# Patient Record
Sex: MALE | Race: WHITE | NOT HISPANIC OR LATINO | Employment: OTHER | URBAN - METROPOLITAN AREA
[De-identification: names, ages, dates, MRNs, and addresses within clinical notes are randomized per-mention and may not be internally consistent; named-entity substitution may affect disease eponyms.]

---

## 2023-01-01 ENCOUNTER — HOSPITAL ENCOUNTER (OUTPATIENT)
Facility: HOSPITAL | Age: 83
Setting detail: OBSERVATION
Discharge: NURSING, REHAB OR LONG-TERM CARE | End: 2023-12-13
Attending: STUDENT IN AN ORGANIZED HEALTH CARE EDUCATION/TRAINING PROGRAM | Admitting: INTERNAL MEDICINE
Payer: MEDICARE

## 2023-01-01 VITALS
WEIGHT: 230 LBS | SYSTOLIC BLOOD PRESSURE: 114 MMHG | HEART RATE: 73 BPM | DIASTOLIC BLOOD PRESSURE: 57 MMHG | HEIGHT: 75 IN | RESPIRATION RATE: 16 BRPM | TEMPERATURE: 98.8 F | BODY MASS INDEX: 28.6 KG/M2 | OXYGEN SATURATION: 96 %

## 2023-01-01 DIAGNOSIS — L03.90 CELLULITIS, UNSPECIFIED CELLULITIS SITE: Primary | ICD-10-CM

## 2023-01-01 DIAGNOSIS — I83.008: ICD-10-CM

## 2023-01-01 DIAGNOSIS — I83.009 VENOUS STASIS ULCER, UNSPECIFIED SITE, UNSPECIFIED ULCER STAGE, UNSPECIFIED WHETHER VARICOSE VEINS PRESENT (MULTI): ICD-10-CM

## 2023-01-01 DIAGNOSIS — L03.116 CELLULITIS OF LEFT LOWER LIMB: ICD-10-CM

## 2023-01-01 DIAGNOSIS — L97.809: ICD-10-CM

## 2023-01-01 DIAGNOSIS — E78.5 HYPERLIPIDEMIA, UNSPECIFIED HYPERLIPIDEMIA TYPE: ICD-10-CM

## 2023-01-01 DIAGNOSIS — I10 HYPERTENSION, UNSPECIFIED TYPE: ICD-10-CM

## 2023-01-01 DIAGNOSIS — L97.909 VENOUS STASIS ULCER, UNSPECIFIED SITE, UNSPECIFIED ULCER STAGE, UNSPECIFIED WHETHER VARICOSE VEINS PRESENT (MULTI): ICD-10-CM

## 2023-01-01 DIAGNOSIS — E11.9 TYPE 2 DIABETES MELLITUS WITHOUT COMPLICATION, UNSPECIFIED WHETHER LONG TERM INSULIN USE (MULTI): ICD-10-CM

## 2023-01-01 LAB
ANION GAP SERPL CALC-SCNC: 10 MMOL/L (ref 10–20)
ANION GAP SERPL CALC-SCNC: 12 MMOL/L (ref 10–20)
BASOPHILS # BLD AUTO: 0.03 X10*3/UL (ref 0–0.1)
BASOPHILS NFR BLD AUTO: 0.5 %
BUN SERPL-MCNC: 12 MG/DL (ref 6–23)
BUN SERPL-MCNC: 14 MG/DL (ref 6–23)
CALCIUM SERPL-MCNC: 8.3 MG/DL (ref 8.6–10.3)
CALCIUM SERPL-MCNC: 8.5 MG/DL (ref 8.6–10.3)
CHLORIDE SERPL-SCNC: 102 MMOL/L (ref 98–107)
CHLORIDE SERPL-SCNC: 99 MMOL/L (ref 98–107)
CO2 SERPL-SCNC: 25 MMOL/L (ref 21–32)
CO2 SERPL-SCNC: 26 MMOL/L (ref 21–32)
CREAT SERPL-MCNC: 0.95 MG/DL (ref 0.5–1.3)
CREAT SERPL-MCNC: 1.1 MG/DL (ref 0.5–1.3)
EOSINOPHIL # BLD AUTO: 0.1 X10*3/UL (ref 0–0.4)
EOSINOPHIL NFR BLD AUTO: 1.7 %
ERYTHROCYTE [DISTWIDTH] IN BLOOD BY AUTOMATED COUNT: 12.4 % (ref 11.5–14.5)
ERYTHROCYTE [DISTWIDTH] IN BLOOD BY AUTOMATED COUNT: 12.5 % (ref 11.5–14.5)
EST. AVERAGE GLUCOSE BLD GHB EST-MCNC: 329 MG/DL
GFR SERPL CREATININE-BSD FRML MDRD: 67 ML/MIN/1.73M*2
GFR SERPL CREATININE-BSD FRML MDRD: 79 ML/MIN/1.73M*2
GLUCOSE BLD MANUAL STRIP-MCNC: 126 MG/DL (ref 74–99)
GLUCOSE BLD MANUAL STRIP-MCNC: 126 MG/DL (ref 74–99)
GLUCOSE BLD MANUAL STRIP-MCNC: 151 MG/DL (ref 74–99)
GLUCOSE BLD MANUAL STRIP-MCNC: 180 MG/DL (ref 74–99)
GLUCOSE BLD MANUAL STRIP-MCNC: 186 MG/DL (ref 74–99)
GLUCOSE BLD MANUAL STRIP-MCNC: 200 MG/DL (ref 74–99)
GLUCOSE BLD MANUAL STRIP-MCNC: 249 MG/DL (ref 74–99)
GLUCOSE BLD MANUAL STRIP-MCNC: 254 MG/DL (ref 74–99)
GLUCOSE BLD MANUAL STRIP-MCNC: 260 MG/DL (ref 74–99)
GLUCOSE BLD MANUAL STRIP-MCNC: 265 MG/DL (ref 74–99)
GLUCOSE BLD MANUAL STRIP-MCNC: 293 MG/DL (ref 74–99)
GLUCOSE SERPL-MCNC: 180 MG/DL (ref 74–99)
GLUCOSE SERPL-MCNC: 299 MG/DL (ref 74–99)
HBA1C MFR BLD: 13.1 %
HCT VFR BLD AUTO: 42.6 % (ref 41–52)
HCT VFR BLD AUTO: 45.4 % (ref 41–52)
HGB BLD-MCNC: 14.3 G/DL (ref 13.5–17.5)
HGB BLD-MCNC: 15.6 G/DL (ref 13.5–17.5)
IMM GRANULOCYTES # BLD AUTO: 0.02 X10*3/UL (ref 0–0.5)
IMM GRANULOCYTES NFR BLD AUTO: 0.3 % (ref 0–0.9)
LYMPHOCYTES # BLD AUTO: 1.19 X10*3/UL (ref 0.8–3)
LYMPHOCYTES NFR BLD AUTO: 20.2 %
MCH RBC QN AUTO: 31 PG (ref 26–34)
MCH RBC QN AUTO: 31.1 PG (ref 26–34)
MCHC RBC AUTO-ENTMCNC: 33.6 G/DL (ref 32–36)
MCHC RBC AUTO-ENTMCNC: 34.4 G/DL (ref 32–36)
MCV RBC AUTO: 91 FL (ref 80–100)
MCV RBC AUTO: 92 FL (ref 80–100)
MONOCYTES # BLD AUTO: 0.37 X10*3/UL (ref 0.05–0.8)
MONOCYTES NFR BLD AUTO: 6.3 %
NEUTROPHILS # BLD AUTO: 4.17 X10*3/UL (ref 1.6–5.5)
NEUTROPHILS NFR BLD AUTO: 71 %
NRBC BLD-RTO: 0 /100 WBCS (ref 0–0)
NRBC BLD-RTO: 0 /100 WBCS (ref 0–0)
PLATELET # BLD AUTO: 231 X10*3/UL (ref 150–450)
PLATELET # BLD AUTO: 231 X10*3/UL (ref 150–450)
POTASSIUM SERPL-SCNC: 4.3 MMOL/L (ref 3.5–5.3)
POTASSIUM SERPL-SCNC: 4.6 MMOL/L (ref 3.5–5.3)
RBC # BLD AUTO: 4.62 X10*6/UL (ref 4.5–5.9)
RBC # BLD AUTO: 5.01 X10*6/UL (ref 4.5–5.9)
SODIUM SERPL-SCNC: 131 MMOL/L (ref 136–145)
SODIUM SERPL-SCNC: 134 MMOL/L (ref 136–145)
WBC # BLD AUTO: 5.8 X10*3/UL (ref 4.4–11.3)
WBC # BLD AUTO: 5.9 X10*3/UL (ref 4.4–11.3)

## 2023-01-01 PROCEDURE — 2500000001 HC RX 250 WO HCPCS SELF ADMINISTERED DRUGS (ALT 637 FOR MEDICARE OP): Performed by: INTERNAL MEDICINE

## 2023-01-01 PROCEDURE — 99239 HOSP IP/OBS DSCHRG MGMT >30: CPT | Performed by: INTERNAL MEDICINE

## 2023-01-01 PROCEDURE — 96372 THER/PROPH/DIAG INJ SC/IM: CPT | Performed by: INTERNAL MEDICINE

## 2023-01-01 PROCEDURE — 36415 COLL VENOUS BLD VENIPUNCTURE: CPT | Performed by: STUDENT IN AN ORGANIZED HEALTH CARE EDUCATION/TRAINING PROGRAM

## 2023-01-01 PROCEDURE — 2500000002 HC RX 250 W HCPCS SELF ADMINISTERED DRUGS (ALT 637 FOR MEDICARE OP, ALT 636 FOR OP/ED): Performed by: INTERNAL MEDICINE

## 2023-01-01 PROCEDURE — 82947 ASSAY GLUCOSE BLOOD QUANT: CPT

## 2023-01-01 PROCEDURE — 36415 COLL VENOUS BLD VENIPUNCTURE: CPT | Performed by: INTERNAL MEDICINE

## 2023-01-01 PROCEDURE — 80048 BASIC METABOLIC PNL TOTAL CA: CPT | Performed by: STUDENT IN AN ORGANIZED HEALTH CARE EDUCATION/TRAINING PROGRAM

## 2023-01-01 PROCEDURE — 2500000004 HC RX 250 GENERAL PHARMACY W/ HCPCS (ALT 636 FOR OP/ED): Performed by: INTERNAL MEDICINE

## 2023-01-01 PROCEDURE — 83036 HEMOGLOBIN GLYCOSYLATED A1C: CPT | Performed by: INTERNAL MEDICINE

## 2023-01-01 PROCEDURE — 2500000001 HC RX 250 WO HCPCS SELF ADMINISTERED DRUGS (ALT 637 FOR MEDICARE OP): Performed by: NURSE PRACTITIONER

## 2023-01-01 PROCEDURE — G0378 HOSPITAL OBSERVATION PER HR: HCPCS

## 2023-01-01 PROCEDURE — 2500000002 HC RX 250 W HCPCS SELF ADMINISTERED DRUGS (ALT 637 FOR MEDICARE OP, ALT 636 FOR OP/ED): Performed by: NURSE PRACTITIONER

## 2023-01-01 PROCEDURE — 99285 EMERGENCY DEPT VISIT HI MDM: CPT | Performed by: STUDENT IN AN ORGANIZED HEALTH CARE EDUCATION/TRAINING PROGRAM

## 2023-01-01 PROCEDURE — 99232 SBSQ HOSP IP/OBS MODERATE 35: CPT | Performed by: INTERNAL MEDICINE

## 2023-01-01 PROCEDURE — 85025 COMPLETE CBC W/AUTO DIFF WBC: CPT | Performed by: STUDENT IN AN ORGANIZED HEALTH CARE EDUCATION/TRAINING PROGRAM

## 2023-01-01 PROCEDURE — 85027 COMPLETE CBC AUTOMATED: CPT | Performed by: INTERNAL MEDICINE

## 2023-01-01 PROCEDURE — 99222 1ST HOSP IP/OBS MODERATE 55: CPT | Performed by: INTERNAL MEDICINE

## 2023-01-01 PROCEDURE — 97165 OT EVAL LOW COMPLEX 30 MIN: CPT | Mod: GO

## 2023-01-01 PROCEDURE — 99232 SBSQ HOSP IP/OBS MODERATE 35: CPT | Performed by: NURSE PRACTITIONER

## 2023-01-01 PROCEDURE — 97161 PT EVAL LOW COMPLEX 20 MIN: CPT | Mod: GP

## 2023-01-01 PROCEDURE — 2500000004 HC RX 250 GENERAL PHARMACY W/ HCPCS (ALT 636 FOR OP/ED): Mod: MUE | Performed by: INTERNAL MEDICINE

## 2023-01-01 PROCEDURE — 80048 BASIC METABOLIC PNL TOTAL CA: CPT | Performed by: INTERNAL MEDICINE

## 2023-01-01 RX ORDER — INSULIN GLARGINE 100 [IU]/ML
12 INJECTION, SOLUTION SUBCUTANEOUS EVERY 24 HOURS
Status: DISCONTINUED | OUTPATIENT
Start: 2023-01-01 | End: 2023-01-01

## 2023-01-01 RX ORDER — ACETAMINOPHEN 325 MG/1
650 TABLET ORAL EVERY 4 HOURS PRN
Status: DISCONTINUED | OUTPATIENT
Start: 2023-01-01 | End: 2023-01-01 | Stop reason: HOSPADM

## 2023-01-01 RX ORDER — ENOXAPARIN SODIUM 100 MG/ML
40 INJECTION SUBCUTANEOUS EVERY 24 HOURS
Status: DISCONTINUED | OUTPATIENT
Start: 2023-01-01 | End: 2023-01-01 | Stop reason: HOSPADM

## 2023-01-01 RX ORDER — TALC
3 POWDER (GRAM) TOPICAL DAILY
Status: DISCONTINUED | OUTPATIENT
Start: 2023-01-01 | End: 2023-01-01 | Stop reason: HOSPADM

## 2023-01-01 RX ORDER — ENALAPRIL MALEATE 10 MG/1
10 TABLET ORAL DAILY
Start: 2023-01-01 | End: 2024-01-04

## 2023-01-01 RX ORDER — PETROLATUM 420 MG/G
OINTMENT TOPICAL 2 TIMES DAILY
Status: DISCONTINUED | OUTPATIENT
Start: 2023-01-01 | End: 2023-01-01 | Stop reason: HOSPADM

## 2023-01-01 RX ORDER — PETROLATUM 420 MG/G
OINTMENT TOPICAL
Status: DISCONTINUED | OUTPATIENT
Start: 2023-01-01 | End: 2023-01-01 | Stop reason: HOSPADM

## 2023-01-01 RX ORDER — SULFAMETHOXAZOLE AND TRIMETHOPRIM 800; 160 MG/1; MG/1
1 TABLET ORAL EVERY 12 HOURS SCHEDULED
Start: 2023-01-01 | End: 2023-01-01

## 2023-01-01 RX ORDER — INSULIN GLARGINE 100 [IU]/ML
15 INJECTION, SOLUTION SUBCUTANEOUS EVERY 24 HOURS
Status: DISCONTINUED | OUTPATIENT
Start: 2023-01-01 | End: 2023-01-01 | Stop reason: HOSPADM

## 2023-01-01 RX ORDER — ATORVASTATIN CALCIUM 40 MG/1
40 TABLET, FILM COATED ORAL NIGHTLY
Start: 2023-01-01 | End: 2024-01-04

## 2023-01-01 RX ORDER — SULFAMETHOXAZOLE AND TRIMETHOPRIM 800; 160 MG/1; MG/1
1 TABLET ORAL ONCE
Status: DISCONTINUED | OUTPATIENT
Start: 2023-01-01 | End: 2023-01-01

## 2023-01-01 RX ORDER — PETROLATUM 420 MG/G
1 OINTMENT TOPICAL 2 TIMES DAILY
Refills: 0
Start: 2023-01-01 | End: 2024-01-04

## 2023-01-01 RX ORDER — INSULIN LISPRO 100 [IU]/ML
0-5 INJECTION, SOLUTION INTRAVENOUS; SUBCUTANEOUS
Status: DISCONTINUED | OUTPATIENT
Start: 2023-01-01 | End: 2023-01-01 | Stop reason: HOSPADM

## 2023-01-01 RX ORDER — DEXTROSE 50 % IN WATER (D50W) INTRAVENOUS SYRINGE
25
Status: DISCONTINUED | OUTPATIENT
Start: 2023-01-01 | End: 2023-01-01 | Stop reason: HOSPADM

## 2023-01-01 RX ORDER — ATORVASTATIN CALCIUM 40 MG/1
40 TABLET, FILM COATED ORAL NIGHTLY
Status: CANCELLED
Start: 2023-01-01

## 2023-01-01 RX ORDER — INSULIN LISPRO 100 [IU]/ML
7 INJECTION, SOLUTION INTRAVENOUS; SUBCUTANEOUS
Status: DISCONTINUED | OUTPATIENT
Start: 2023-01-01 | End: 2023-01-01

## 2023-01-01 RX ORDER — ENALAPRIL MALEATE 10 MG/1
10 TABLET ORAL DAILY
Status: DISCONTINUED | OUTPATIENT
Start: 2023-01-01 | End: 2023-01-01 | Stop reason: HOSPADM

## 2023-01-01 RX ORDER — INSULIN GLARGINE 100 [IU]/ML
15 INJECTION, SOLUTION SUBCUTANEOUS NIGHTLY
Start: 2023-01-01 | End: 2024-01-04

## 2023-01-01 RX ORDER — POLYETHYLENE GLYCOL 3350 17 G/17G
17 POWDER, FOR SOLUTION ORAL DAILY
Status: DISCONTINUED | OUTPATIENT
Start: 2023-01-01 | End: 2023-01-01 | Stop reason: HOSPADM

## 2023-01-01 RX ORDER — CARVEDILOL 3.12 MG/1
3.12 TABLET ORAL 2 TIMES DAILY
Status: DISCONTINUED | OUTPATIENT
Start: 2023-01-01 | End: 2023-01-01 | Stop reason: HOSPADM

## 2023-01-01 RX ORDER — INSULIN LISPRO 100 [IU]/ML
9 INJECTION, SOLUTION INTRAVENOUS; SUBCUTANEOUS
Start: 2023-01-01 | End: 2024-01-04

## 2023-01-01 RX ORDER — DEXTROSE MONOHYDRATE 100 MG/ML
0.3 INJECTION, SOLUTION INTRAVENOUS ONCE AS NEEDED
Status: DISCONTINUED | OUTPATIENT
Start: 2023-01-01 | End: 2023-01-01 | Stop reason: HOSPADM

## 2023-01-01 RX ORDER — INSULIN LISPRO 100 [IU]/ML
9 INJECTION, SOLUTION INTRAVENOUS; SUBCUTANEOUS
Status: DISCONTINUED | OUTPATIENT
Start: 2023-01-01 | End: 2023-01-01 | Stop reason: HOSPADM

## 2023-01-01 RX ORDER — ENALAPRIL MALEATE 10 MG/1
10 TABLET ORAL DAILY
Status: CANCELLED
Start: 2023-01-01

## 2023-01-01 RX ORDER — DOCUSATE SODIUM 100 MG/1
100 CAPSULE, LIQUID FILLED ORAL 2 TIMES DAILY PRN
Status: DISCONTINUED | OUTPATIENT
Start: 2023-01-01 | End: 2023-01-01 | Stop reason: HOSPADM

## 2023-01-01 RX ORDER — INSULIN LISPRO 100 [IU]/ML
7 INJECTION, SOLUTION INTRAVENOUS; SUBCUTANEOUS
Start: 2023-01-01 | End: 2023-01-01 | Stop reason: HOSPADM

## 2023-01-01 RX ORDER — CARVEDILOL 3.12 MG/1
3.12 TABLET ORAL 2 TIMES DAILY
Start: 2023-01-01 | End: 2024-01-04

## 2023-01-01 RX ORDER — SULFAMETHOXAZOLE AND TRIMETHOPRIM 800; 160 MG/1; MG/1
160 TABLET ORAL EVERY 12 HOURS SCHEDULED
Status: DISCONTINUED | OUTPATIENT
Start: 2023-01-01 | End: 2023-01-01 | Stop reason: HOSPADM

## 2023-01-01 RX ORDER — PETROLATUM 420 MG/G
1 OINTMENT TOPICAL 2 TIMES DAILY
Start: 2023-01-01 | End: 2023-01-01 | Stop reason: HOSPADM

## 2023-01-01 RX ORDER — ATORVASTATIN CALCIUM 40 MG/1
40 TABLET, FILM COATED ORAL NIGHTLY
Status: DISCONTINUED | OUTPATIENT
Start: 2023-01-01 | End: 2023-01-01 | Stop reason: HOSPADM

## 2023-01-01 RX ADMIN — INSULIN LISPRO 1 UNITS: 100 INJECTION, SOLUTION INTRAVENOUS; SUBCUTANEOUS at 09:28

## 2023-01-01 RX ADMIN — SULFAMETHOXAZOLE AND TRIMETHOPRIM 160 MG: 800; 160 TABLET ORAL at 20:43

## 2023-01-01 RX ADMIN — ATORVASTATIN CALCIUM 40 MG: 40 TABLET, FILM COATED ORAL at 20:43

## 2023-01-01 RX ADMIN — INSULIN GLARGINE 12 UNITS: 100 INJECTION, SOLUTION SUBCUTANEOUS at 09:22

## 2023-01-01 RX ADMIN — POLYETHYLENE GLYCOL 3350 17 G: 17 POWDER, FOR SOLUTION ORAL at 09:00

## 2023-01-01 RX ADMIN — INSULIN LISPRO 3 UNITS: 100 INJECTION, SOLUTION INTRAVENOUS; SUBCUTANEOUS at 17:41

## 2023-01-01 RX ADMIN — INSULIN LISPRO 9 UNITS: 100 INJECTION, SOLUTION INTRAVENOUS; SUBCUTANEOUS at 12:14

## 2023-01-01 RX ADMIN — INSULIN LISPRO 7 UNITS: 100 INJECTION, SOLUTION INTRAVENOUS; SUBCUTANEOUS at 08:03

## 2023-01-01 RX ADMIN — WHITE PETROLATUM: 1.75 OINTMENT TOPICAL at 15:01

## 2023-01-01 RX ADMIN — INSULIN LISPRO 3 UNITS: 100 INJECTION, SOLUTION INTRAVENOUS; SUBCUTANEOUS at 10:23

## 2023-01-01 RX ADMIN — CARVEDILOL 3.12 MG: 3.12 TABLET, FILM COATED ORAL at 21:09

## 2023-01-01 RX ADMIN — INSULIN LISPRO 7 UNITS: 100 INJECTION, SOLUTION INTRAVENOUS; SUBCUTANEOUS at 17:41

## 2023-01-01 RX ADMIN — ENOXAPARIN SODIUM 40 MG: 40 INJECTION SUBCUTANEOUS at 08:02

## 2023-01-01 RX ADMIN — INSULIN LISPRO 3 UNITS: 100 INJECTION, SOLUTION INTRAVENOUS; SUBCUTANEOUS at 17:37

## 2023-01-01 RX ADMIN — INSULIN LISPRO 2 UNITS: 100 INJECTION, SOLUTION INTRAVENOUS; SUBCUTANEOUS at 12:14

## 2023-01-01 RX ADMIN — CARVEDILOL 3.12 MG: 3.12 TABLET, FILM COATED ORAL at 09:22

## 2023-01-01 RX ADMIN — INSULIN LISPRO 9 UNITS: 100 INJECTION, SOLUTION INTRAVENOUS; SUBCUTANEOUS at 17:28

## 2023-01-01 RX ADMIN — ATORVASTATIN CALCIUM 40 MG: 40 TABLET, FILM COATED ORAL at 21:09

## 2023-01-01 RX ADMIN — ENALAPRIL MALEATE 10 MG: 10 TABLET ORAL at 09:35

## 2023-01-01 RX ADMIN — SULFAMETHOXAZOLE AND TRIMETHOPRIM 160 MG: 800; 160 TABLET ORAL at 08:02

## 2023-01-01 RX ADMIN — Medication 3 MG: at 20:42

## 2023-01-01 RX ADMIN — WHITE PETROLATUM: 1.75 OINTMENT TOPICAL at 21:13

## 2023-01-01 RX ADMIN — ENALAPRIL MALEATE 10 MG: 10 TABLET ORAL at 08:08

## 2023-01-01 RX ADMIN — INSULIN GLARGINE 15 UNITS: 100 INJECTION, SOLUTION SUBCUTANEOUS at 09:19

## 2023-01-01 RX ADMIN — POLYETHYLENE GLYCOL 3350 17 G: 17 POWDER, FOR SOLUTION ORAL at 10:30

## 2023-01-01 RX ADMIN — ENOXAPARIN SODIUM 40 MG: 40 INJECTION SUBCUTANEOUS at 10:22

## 2023-01-01 RX ADMIN — CARVEDILOL 3.12 MG: 3.12 TABLET, FILM COATED ORAL at 20:43

## 2023-01-01 RX ADMIN — SULFAMETHOXAZOLE AND TRIMETHOPRIM 160 MG: 800; 160 TABLET ORAL at 09:22

## 2023-01-01 RX ADMIN — INSULIN LISPRO 7 UNITS: 100 INJECTION, SOLUTION INTRAVENOUS; SUBCUTANEOUS at 17:39

## 2023-01-01 RX ADMIN — ACETAMINOPHEN 650 MG: 325 TABLET ORAL at 22:37

## 2023-01-01 RX ADMIN — WHITE PETROLATUM: 1.75 OINTMENT TOPICAL at 08:02

## 2023-01-01 RX ADMIN — CARVEDILOL 3.12 MG: 3.12 TABLET, FILM COATED ORAL at 10:23

## 2023-01-01 RX ADMIN — ENOXAPARIN SODIUM 40 MG: 40 INJECTION SUBCUTANEOUS at 09:22

## 2023-01-01 RX ADMIN — Medication 3 MG: at 22:38

## 2023-01-01 RX ADMIN — SULFAMETHOXAZOLE AND TRIMETHOPRIM 160 MG: 800; 160 TABLET ORAL at 22:39

## 2023-01-01 RX ADMIN — Medication 3 MG: at 21:09

## 2023-01-01 RX ADMIN — INSULIN LISPRO 1 UNITS: 100 INJECTION, SOLUTION INTRAVENOUS; SUBCUTANEOUS at 08:03

## 2023-01-01 RX ADMIN — CARVEDILOL 3.12 MG: 3.12 TABLET, FILM COATED ORAL at 08:02

## 2023-01-01 RX ADMIN — SULFAMETHOXAZOLE AND TRIMETHOPRIM 160 MG: 800; 160 TABLET ORAL at 21:09

## 2023-01-01 RX ADMIN — POLYETHYLENE GLYCOL 3350 17 G: 17 POWDER, FOR SOLUTION ORAL at 08:02

## 2023-01-01 RX ADMIN — SULFAMETHOXAZOLE AND TRIMETHOPRIM 160 MG: 800; 160 TABLET ORAL at 10:23

## 2023-01-01 SDOH — SOCIAL STABILITY: SOCIAL INSECURITY: ARE YOU OR HAVE YOU BEEN THREATENED OR ABUSED PHYSICALLY, EMOTIONALLY, OR SEXUALLY BY ANYONE?: NO

## 2023-01-01 SDOH — SOCIAL STABILITY: SOCIAL INSECURITY: HAVE YOU HAD THOUGHTS OF HARMING ANYONE ELSE?: NO

## 2023-01-01 SDOH — SOCIAL STABILITY: SOCIAL INSECURITY: WERE YOU ABLE TO COMPLETE ALL THE BEHAVIORAL HEALTH SCREENINGS?: YES

## 2023-01-01 SDOH — SOCIAL STABILITY: SOCIAL INSECURITY: DO YOU FEEL UNSAFE GOING BACK TO THE PLACE WHERE YOU ARE LIVING?: NO

## 2023-01-01 SDOH — SOCIAL STABILITY: SOCIAL INSECURITY: DOES ANYONE TRY TO KEEP YOU FROM HAVING/CONTACTING OTHER FRIENDS OR DOING THINGS OUTSIDE YOUR HOME?: NO

## 2023-01-01 SDOH — SOCIAL STABILITY: SOCIAL INSECURITY: DO YOU FEEL ANYONE HAS EXPLOITED OR TAKEN ADVANTAGE OF YOU FINANCIALLY OR OF YOUR PERSONAL PROPERTY?: NO

## 2023-01-01 SDOH — SOCIAL STABILITY: SOCIAL INSECURITY: ARE THERE ANY APPARENT SIGNS OF INJURIES/BEHAVIORS THAT COULD BE RELATED TO ABUSE/NEGLECT?: NO

## 2023-01-01 SDOH — SOCIAL STABILITY: SOCIAL INSECURITY: ABUSE: ADULT

## 2023-01-01 SDOH — SOCIAL STABILITY: SOCIAL INSECURITY: HAS ANYONE EVER THREATENED TO HURT YOUR FAMILY OR YOUR PETS?: NO

## 2023-01-01 ASSESSMENT — COGNITIVE AND FUNCTIONAL STATUS - GENERAL
CLIMB 3 TO 5 STEPS WITH RAILING: TOTAL
STANDING UP FROM CHAIR USING ARMS: TOTAL
MOVING TO AND FROM BED TO CHAIR: A LOT
TURNING FROM BACK TO SIDE WHILE IN FLAT BAD: A LOT
TOILETING: TOTAL
DRESSING REGULAR LOWER BODY CLOTHING: TOTAL
DAILY ACTIVITIY SCORE: 21
DAILY ACTIVITIY SCORE: 12
WALKING IN HOSPITAL ROOM: A LITTLE
TOILETING: A LITTLE
MOBILITY SCORE: 11
CLIMB 3 TO 5 STEPS WITH RAILING: A LOT
STANDING UP FROM CHAIR USING ARMS: A LITTLE
TOILETING: TOTAL
PATIENT BASELINE BEDBOUND: NO
CLIMB 3 TO 5 STEPS WITH RAILING: TOTAL
PERSONAL GROOMING: A LITTLE
DRESSING REGULAR LOWER BODY CLOTHING: A LITTLE
MOVING TO AND FROM BED TO CHAIR: A LITTLE
MOBILITY SCORE: 19
WALKING IN HOSPITAL ROOM: TOTAL
HELP NEEDED FOR BATHING: TOTAL
PERSONAL GROOMING: A LITTLE
DRESSING REGULAR UPPER BODY CLOTHING: A LOT
HELP NEEDED FOR BATHING: A LITTLE
MOVING TO AND FROM BED TO CHAIR: A LOT
MOBILITY SCORE: 11
HELP NEEDED FOR BATHING: TOTAL
STANDING UP FROM CHAIR USING ARMS: TOTAL
DAILY ACTIVITIY SCORE: 12
TURNING FROM BACK TO SIDE WHILE IN FLAT BAD: A LOT
WALKING IN HOSPITAL ROOM: TOTAL
DRESSING REGULAR LOWER BODY CLOTHING: TOTAL
DRESSING REGULAR UPPER BODY CLOTHING: A LOT

## 2023-01-01 ASSESSMENT — ENCOUNTER SYMPTOMS
CHILLS: 0
SEIZURES: 0
PALPITATIONS: 0
VOMITING: 0
DIZZINESS: 0
FLANK PAIN: 0
FREQUENCY: 0
COUGH: 0
DIFFICULTY URINATING: 0
HALLUCINATIONS: 0
WOUND: 1
EYE PAIN: 0
WHEEZING: 0
SORE THROAT: 0
LIGHT-HEADEDNESS: 0
NAUSEA: 0
CONFUSION: 0
ABDOMINAL PAIN: 0
NECK PAIN: 0
BLOOD IN STOOL: 0
BACK PAIN: 0
SHORTNESS OF BREATH: 0
FACIAL SWELLING: 0
POLYDIPSIA: 0
SINUS PRESSURE: 0
FEVER: 0
HEADACHES: 0
JOINT SWELLING: 0
DYSURIA: 0
DIARRHEA: 0
APPETITE CHANGE: 0
EYE REDNESS: 0
SLEEP DISTURBANCE: 0
CONSTIPATION: 0
HEMATURIA: 0

## 2023-01-01 ASSESSMENT — ACTIVITIES OF DAILY LIVING (ADL)
GROOMING: INDEPENDENT
HEARING - LEFT EAR: FUNCTIONAL
DRESSING YOURSELF: NEEDS ASSISTANCE
JUDGMENT_ADEQUATE_SAFELY_COMPLETE_DAILY_ACTIVITIES: NO
WALKS IN HOME: NEEDS ASSISTANCE
ADEQUATE_TO_COMPLETE_ADL: YES
PATIENT'S MEMORY ADEQUATE TO SAFELY COMPLETE DAILY ACTIVITIES?: NO
FEEDING YOURSELF: INDEPENDENT
BATHING: NEEDS ASSISTANCE
LACK_OF_TRANSPORTATION: NO
HEARING - RIGHT EAR: FUNCTIONAL
TOILETING: NEEDS ASSISTANCE

## 2023-01-01 ASSESSMENT — LIFESTYLE VARIABLES
AUDIT-C TOTAL SCORE: 0
AUDIT-C TOTAL SCORE: 0
PRESCIPTION_ABUSE_PAST_12_MONTHS: NO
EVER HAD A DRINK FIRST THING IN THE MORNING TO STEADY YOUR NERVES TO GET RID OF A HANGOVER: NO
SUBSTANCE_ABUSE_PAST_12_MONTHS: NO
SKIP TO QUESTIONS 9-10: 1
EVER FELT BAD OR GUILTY ABOUT YOUR DRINKING: NO
SUBSTANCE_ABUSE_PAST_12_MONTHS: NO
HOW MANY STANDARD DRINKS CONTAINING ALCOHOL DO YOU HAVE ON A TYPICAL DAY: PATIENT DOES NOT DRINK
HOW OFTEN DO YOU HAVE 6 OR MORE DRINKS ON ONE OCCASION: NEVER
HAVE YOU EVER FELT YOU SHOULD CUT DOWN ON YOUR DRINKING: NO
HOW OFTEN DO YOU HAVE A DRINK CONTAINING ALCOHOL: NEVER
HAVE PEOPLE ANNOYED YOU BY CRITICIZING YOUR DRINKING: NO
REASON UNABLE TO ASSESS: NO
PRESCIPTION_ABUSE_PAST_12_MONTHS: NO

## 2023-01-01 ASSESSMENT — PAIN SCALES - GENERAL
PAINLEVEL_OUTOF10: 0 - NO PAIN
PAINLEVEL_OUTOF10: 2
PAINLEVEL_OUTOF10: 0 - NO PAIN
PAINLEVEL_OUTOF10: 0 - NO PAIN

## 2023-01-01 ASSESSMENT — PATIENT HEALTH QUESTIONNAIRE - PHQ9
SUM OF ALL RESPONSES TO PHQ9 QUESTIONS 1 & 2: 0
2. FEELING DOWN, DEPRESSED OR HOPELESS: NOT AT ALL
1. LITTLE INTEREST OR PLEASURE IN DOING THINGS: NOT AT ALL

## 2023-01-01 ASSESSMENT — COLUMBIA-SUICIDE SEVERITY RATING SCALE - C-SSRS
1. IN THE PAST MONTH, HAVE YOU WISHED YOU WERE DEAD OR WISHED YOU COULD GO TO SLEEP AND NOT WAKE UP?: NO
1. IN THE PAST MONTH, HAVE YOU WISHED YOU WERE DEAD OR WISHED YOU COULD GO TO SLEEP AND NOT WAKE UP?: NO
2. HAVE YOU ACTUALLY HAD ANY THOUGHTS OF KILLING YOURSELF?: NO
6. HAVE YOU EVER DONE ANYTHING, STARTED TO DO ANYTHING, OR PREPARED TO DO ANYTHING TO END YOUR LIFE?: NO
2. HAVE YOU ACTUALLY HAD ANY THOUGHTS OF KILLING YOURSELF?: NO
6. HAVE YOU EVER DONE ANYTHING, STARTED TO DO ANYTHING, OR PREPARED TO DO ANYTHING TO END YOUR LIFE?: NO

## 2023-01-01 ASSESSMENT — PAIN - FUNCTIONAL ASSESSMENT: PAIN_FUNCTIONAL_ASSESSMENT: 0-10

## 2023-12-10 PROBLEM — I83.009 VENOUS STASIS ULCERS (MULTI): Status: ACTIVE | Noted: 2023-01-01

## 2023-12-10 PROBLEM — G47.33 OBSTRUCTIVE SLEEP APNEA (ADULT) (PEDIATRIC): Status: ACTIVE | Noted: 2019-06-20

## 2023-12-10 PROBLEM — I50.9 CONGESTIVE HEART FAILURE (MULTI): Status: ACTIVE | Noted: 2023-01-01

## 2023-12-10 PROBLEM — N32.81 OAB (OVERACTIVE BLADDER): Status: ACTIVE | Noted: 2023-01-01

## 2023-12-10 PROBLEM — N40.0 BPH (BENIGN PROSTATIC HYPERPLASIA): Status: ACTIVE | Noted: 2023-01-01

## 2023-12-10 PROBLEM — I87.8 OTHER SPECIFIED DISORDERS OF VEINS: Status: ACTIVE | Noted: 2019-06-20

## 2023-12-10 PROBLEM — R26.2 DIFFICULTY IN WALKING, NOT ELSEWHERE CLASSIFIED: Status: ACTIVE | Noted: 2019-06-20

## 2023-12-10 PROBLEM — E11.40 DIABETIC NEUROPATHY (MULTI): Status: ACTIVE | Noted: 2023-01-01

## 2023-12-10 PROBLEM — K21.9 ESOPHAGEAL REFLUX: Status: ACTIVE | Noted: 2023-01-01

## 2023-12-10 PROBLEM — R29.6 REPEATED FALLS: Status: ACTIVE | Noted: 2019-06-20

## 2023-12-10 PROBLEM — L97.909 VENOUS STASIS ULCERS (MULTI): Status: ACTIVE | Noted: 2023-01-01

## 2023-12-10 PROBLEM — R60.9 PERIPHERAL EDEMA: Status: ACTIVE | Noted: 2019-06-20

## 2023-12-10 PROBLEM — E11.9 DM2 (DIABETES MELLITUS, TYPE 2) (MULTI): Status: ACTIVE | Noted: 2019-06-20

## 2023-12-10 PROBLEM — J44.9 COPD (CHRONIC OBSTRUCTIVE PULMONARY DISEASE) (MULTI): Status: ACTIVE | Noted: 2019-06-20

## 2023-12-10 PROBLEM — I73.9 PERIPHERAL VASCULAR DISEASE, UNSPECIFIED (CMS-HCC): Status: ACTIVE | Noted: 2019-06-26

## 2023-12-10 PROBLEM — E66.9 ADULT-ONSET OBESITY: Status: ACTIVE | Noted: 2019-06-20

## 2023-12-10 PROBLEM — M19.90 UNSPECIFIED OSTEOARTHRITIS, UNSPECIFIED SITE: Status: ACTIVE | Noted: 2019-06-20

## 2023-12-10 PROBLEM — M15.9 GENERALIZED OSTEOARTHRITIS: Status: ACTIVE | Noted: 2023-01-01

## 2023-12-10 PROBLEM — L03.90 CELLULITIS, UNSPECIFIED CELLULITIS SITE: Status: ACTIVE | Noted: 2023-01-01

## 2023-12-10 PROBLEM — L03.116 CELLULITIS OF LEFT LOWER LIMB: Status: ACTIVE | Noted: 2019-06-20

## 2023-12-10 PROBLEM — R29.898 LEG WEAKNESS: Status: ACTIVE | Noted: 2023-01-01

## 2023-12-10 PROBLEM — I10 BENIGN ESSENTIAL HYPERTENSION: Status: ACTIVE | Noted: 2023-01-01

## 2023-12-10 PROBLEM — K57.30 DIVERTICULOSIS OF COLON: Status: ACTIVE | Noted: 2023-01-01

## 2023-12-10 NOTE — DISCHARGE INSTRUCTIONS
WOUND CARE:  BLE brodie feet: wash with soap and water, apply Aquaphor daily  BLE: scabs: apply Betadine, allow to dry, may use a tubifast to secure dry gauze over scabs.                          This article is about caring for minor wounds (like small cuts and scrapes) that do not need to be closed with stitches, staples, or skin glue. If you got stitches, staples, or glue, your doctor or nurse will tell you how to care for yourself.    How do I take care of a minor wound on my own?  To take care of your wound, follow these basic first aid guidelines:    ?Clean the wound - Wash it well with soap and water. If there is dirt, glass, or another object in your cut that you can't get out after you wash it, call your doctor or nurse.    ?Stop the bleeding - If your wound is bleeding, press a clean cloth or bandage firmly on the area for 20 minutes. You can also help slow the bleeding by holding the wound above the level of your heart, if possible. If the bleeding doesn't stop after 20 minutes, call your doctor or nurse.    ?Put a thin layer of antibiotic ointment on the wound    ?Cover the wound with a bandage or gauze. Keep the bandage clean and dry. Change the bandage 1 to 2 times every day until your wound heals.    ?Do not swim or soak your wound in water until it has healed. Ask your doctor or nurse if you have any questions.    ?Each time you change the bandage, look at your skin to check for signs of infection. These include redness that is getting worse or spreading, swelling, or warmth in the area. You might see some thin clear or yellow fluid as the wound heals, which is normal.    Most minor wounds heal on their own within 7 to 10 days. As your wound heals, a scab will form. Do not pick at the scab or scratch the skin around it.    When should I call the doctor or nurse?  Call the doctor or nurse if you have any signs of an infection. Signs of an infection include:    ?Fever    ?Redness, swelling, warmth, or  increased pain around the wound    ?A bad smell coming from the wound    ?Pus (thick yellow, green, or gray fluid) draining from the wound    ?Red streaks on the skin around the wound, or red streaks going up your arm or leg

## 2023-12-11 NOTE — H&P
History Of Present Illness  Trevor Torres is a 83 y.o. male PMHx HTN, HLD, COPD, JAMSHID, DM2, HFprEF, obesity, nonambulatory presenting with right lower leg wound.  He states he has been in his usual state of health, he picked at a scab on his right lower leg and it started to bleed.  His daughter Bessy did not like the way it looked so wanted patient to be evaluated in the emergency department.  Patient admits to scratching at his lower legs.  Denies N/V/F/C, headache, confusion, chest pain, shortness of breath, cough, abdominal pain, diarrhea, urinary problems.    Vital signs stable in the ED.  Plan was to be discharged home, but apparently patient lives with his sister at her house and sister helps to care for him.  Sister is now in assisted living and patient not able to care for self very well.  He is mostly  bedbound, can pull himself up to stand and into a wheelchair.  Neighbors help out with food and he is not able to get groceries for himself.      Past Medical History  Past Medical History:   Diagnosis Date    History of falling     History of fall    Hyperlipidemia, unspecified 09/05/2018    Hyperlipidemia, unspecified hyperlipidemia type    Obstructive sleep apnea (adult) (pediatric)     JAMSHID treated with BiPAP    Personal history of other diseases of the circulatory system     History of heart failure    Personal history of other diseases of the circulatory system     History of peripheral vascular disease    Personal history of other diseases of the circulatory system     History of hypertension    Personal history of other diseases of the respiratory system     History of chronic obstructive lung disease    Personal history of other endocrine, nutritional and metabolic disease     History of diabetes mellitus    Type 2 diabetes mellitus with diabetic neuropathy, unspecified (CMS/[facility]) 10/27/2022    Diabetic neuropathy       Surgical History  Past Surgical History:   Procedure Laterality Date    CT ABDOMEN  PELVIS ANGIOGRAM W AND/OR WO IV CONTRAST  10/9/2022    CT ABDOMEN PELVIS ANGIOGRAM W AND/OR WO IV CONTRAST 10/9/2022 [facility]        Social History  He has no history on file for tobacco use, alcohol use, and drug use.    Family History  No family history on file.     Allergies  Diphenhydramine hcl, Fluticasone propion-salmeterol, Influenza virus vaccines, Insulin glargine, Metformin, Salmeterol, Saxagliptin, Tetanus immune globulin, Tiotropium, Cephalexin, and Red dye    Review of Systems   Constitutional:  Negative for appetite change, chills and fever.   HENT:  Negative for congestion, ear pain, facial swelling, sinus pressure and sore throat.    Eyes:  Negative for pain, redness and visual disturbance.   Respiratory:  Negative for cough, shortness of breath and wheezing.    Cardiovascular:  Negative for chest pain, palpitations and leg swelling.   Gastrointestinal:  Negative for abdominal pain, blood in stool, constipation, diarrhea, nausea and vomiting.   Endocrine: Negative for polydipsia and polyuria.   Genitourinary:  Negative for difficulty urinating, dysuria, flank pain, frequency and hematuria.   Musculoskeletal:  Negative for back pain, joint swelling and neck pain.   Skin:  Positive for rash and wound.   Neurological:  Negative for dizziness, seizures, syncope, light-headedness and headaches.   Psychiatric/Behavioral:  Negative for confusion, hallucinations and sleep disturbance.    All other systems reviewed and are negative.       Physical Exam  Vitals and nursing note reviewed.   Constitutional:       General: He is not in acute distress.     Appearance: Normal appearance.   HENT:      Head: Normocephalic and atraumatic.      Right Ear: External ear normal.      Left Ear: External ear normal.      Mouth/Throat:      Mouth: Mucous membranes are moist.      Pharynx: Oropharynx is clear.   Eyes:      General: No scleral icterus.     Extraocular Movements: Extraocular movements intact.       "Conjunctiva/sclera: Conjunctivae normal.      Pupils: Pupils are equal, round, and reactive to light.   Cardiovascular:      Rate and Rhythm: Normal rate and regular rhythm.      Pulses: Normal pulses.      Heart sounds: No murmur heard.  Pulmonary:      Effort: Pulmonary effort is normal. No respiratory distress.      Breath sounds: No wheezing, rhonchi or rales.   Abdominal:      General: Bowel sounds are normal. There is no distension.      Palpations: Abdomen is soft.      Tenderness: There is no abdominal tenderness. There is no guarding or rebound.   Musculoskeletal:         General: No swelling, tenderness or deformity.      Cervical back: Neck supple. No tenderness.   Skin:     General: Skin is warm and dry.      Coloration: Skin is not jaundiced.      Findings: Erythema, lesion and rash present.      Comments: Multiple old scabs on his lower legs, RLE with larger area of erythema now wrapped in dressing   Neurological:      General: No focal deficit present.      Mental Status: He is alert and oriented to person, place, and time.   Psychiatric:         Mood and Affect: Mood normal.         Behavior: Behavior normal.          Last Recorded Vitals  Blood pressure 128/68, pulse 84, temperature 36.3 °C (97.3 °F), temperature source Temporal, resp. rate 20, height 1.905 m (6' 3\"), weight 104 kg (230 lb), SpO2 95 %.    Relevant Results        Scheduled medications  enoxaparin, 40 mg, subcutaneous, q24h  melatonin, 3 mg, oral, Daily  [START ON 12/11/2023] polyethylene glycol, 17 g, oral, Daily  sulfamethoxazole-trimethoprim, 160 mg, oral, q12h MANA      Continuous medications     PRN medications  PRN medications: acetaminophen, acetaminophen      Assessment/Plan   Principal Problem:    Cellulitis, unspecified cellulitis site      # RLE wound with superimposed cellulitis  # Inability to ambulate  # HTN  # HLD  # HFprEF  # COPD  # JAMSHID  # DM2  # Obesity    Obtain blood work, wound care to leg wounds.  He is " hemodynamically stable and does not appear toxic.    Bactrim DS BID for 4 days.  Home medications need to be verified by RN to be reconciled.  PT and OT.  DVT prophylaxis.       I spent 45 minutes in the professional and overall care of this patient.      Yonathan Trejo, DO

## 2023-12-11 NOTE — ED PROVIDER NOTES
HPI   Chief Complaint   Patient presents with    Wound Check     Multiple scabs on lower extremities, peeled by patient at home leading to bleeding according to daughter       Patient is a healthy nontoxic-appearing 83-year-old male with past medical history of hypertension, BPH, cellulitis, congestive heart failure, COPD, diabetic neuropathy, type 2 diabetes, eczema, hyperlipidemia, overactive bladder, peripheral edema, peripheral vascular disease, presents to the emergency room today for complaint of wound evaluation.  Patient states he removed a scab from his lower leg and began to bleed.  Patient states his daughter did not like the way the wound looked and felt emergency evaluation would be appropriate.  Patient states wound has been there for several years.  Patient denies any injuries trauma or falls, states he does normally numbness and tingling to the legs due to neuropathy.  Patient denies any other complaints.  Patient denies any chest pain, shortness breath difficulty breathing, abdominal pain, nausea or vomiting, fever, shaking, or chills.                          Steven Coma Scale Score: 15                  Patient History   Past Medical History:   Diagnosis Date    History of falling     History of fall    Hyperlipidemia, unspecified 09/05/2018    Hyperlipidemia, unspecified hyperlipidemia type    Obstructive sleep apnea (adult) (pediatric)     JAMSHID treated with BiPAP    Personal history of other diseases of the circulatory system     History of heart failure    Personal history of other diseases of the circulatory system     History of peripheral vascular disease    Personal history of other diseases of the circulatory system     History of hypertension    Personal history of other diseases of the respiratory system     History of chronic obstructive lung disease    Personal history of other endocrine, nutritional and metabolic disease     History of diabetes mellitus    Type 2 diabetes mellitus with  diabetic neuropathy, unspecified (CMS/[facility]) 10/27/2022    Diabetic neuropathy     Past Surgical History:   Procedure Laterality Date    CT ABDOMEN PELVIS ANGIOGRAM W AND/OR WO IV CONTRAST  10/9/2022    CT ABDOMEN PELVIS ANGIOGRAM W AND/OR WO IV CONTRAST 10/9/2022 [facility]     No family history on file.  Social History     Tobacco Use    Smoking status: Not on file    Smokeless tobacco: Not on file   Substance Use Topics    Alcohol use: Not on file    Drug use: Not on file       Physical Exam   ED Triage Vitals [12/10/23 1852]   Temp Heart Rate Resp BP   36.3 °C (97.3 °F) 84 20 128/68      SpO2 Temp Source Heart Rate Source Patient Position   95 % Temporal Monitor Sitting      BP Location FiO2 (%)     Right arm --       Physical Exam  Vitals and nursing note reviewed.   Constitutional:       General: He is not in acute distress.     Appearance: Normal appearance. He is not ill-appearing, toxic-appearing or diaphoretic.   HENT:      Head: Normocephalic.      Nose: No congestion or rhinorrhea.      Mouth/Throat:      Mouth: Mucous membranes are moist.      Pharynx: No oropharyngeal exudate or posterior oropharyngeal erythema.   Eyes:      General:         Right eye: No discharge.         Left eye: No discharge.      Extraocular Movements: Extraocular movements intact.      Pupils: Pupils are equal, round, and reactive to light.   Cardiovascular:      Rate and Rhythm: Normal rate and regular rhythm.   Pulmonary:      Effort: Pulmonary effort is normal. No respiratory distress.      Breath sounds: Normal breath sounds. No stridor. No wheezing, rhonchi or rales.   Chest:      Chest wall: No tenderness.   Musculoskeletal:         General: Normal range of motion.      Cervical back: Normal range of motion and neck supple.   Skin:     General: Skin is warm.      Capillary Refill: Capillary refill takes less than 2 seconds.      Coloration: Skin is not jaundiced or pale.      Findings: Erythema present. No  bruising, lesion or rash.      Comments: Small superficial skin tear present to the right anterior distal shin, mild amount of surrounding erythema consistent with peripheral vascular disease however is not warm to the touch, no underlying fluctuance induration active bleeding or drainage present   Neurological:      General: No focal deficit present.      Mental Status: He is alert and oriented to person, place, and time.         ED Course & MDM        Medical Decision Making  Given patient's complaint presentation a thorough exam was performed.  Patient does have small superficial skin tear present to the right anterior distal shin with a mild amount of surrounding erythema consistent with peripheral vascular disease however is not warm to touch, no underlying fluctuance induration active bleeding or drainage present, DP PT pulses strong and regular, cap refills less than 3 seconds, denies any injuries trauma or falls, below suspicion for underlying abscess, underlying osseous abnormality, osteomyelitis, vascular compromise.  Given complaint and presentation I do not believe any imaging or lab work is warranted at this time.  I suspect patient is experiencing skin tear.  Wound was cleansed and dressed appropriately.  I encouraged proper wound care and following up with primary care provider as needed however symptoms become worse return to emergency room for further evaluation.  Patient was agreeable with this plan and discharged home in stable condition.      JOSUE Benson     Portions of this note were generated using digital voice recognition software, and may contain grammatical errors       JOSUE Benson  12/10/23 1935

## 2023-12-11 NOTE — PROGRESS NOTES
Pharmacy Medication History Review    Trevor Torres is a 83 y.o. male admitted for Cellulitis, unspecified cellulitis site. Pharmacy reviewed the patient's [id number] medications and allergies for accuracy.    The list below reflectives the updated PTA list. Please review each medication in order reconciliation for additional clarification and justification.  (Not in a hospital admission)       The list below reflectives the updated allergy list. Please review each documented allergy for additional clarification and justification.  Allergies  Reviewed by Kesha Ya CPhT on 12/11/2023        Severity Reactions Comments    Bee Venom Protein (honey Bee) High Anaphylaxis     Metformin Medium Hallucinations     Diphenhydramine Hcl Not Specified Unknown Red eye in benadryl    Saxagliptin Not Specified Unknown     Cephalexin Low Rash, Hives     Fluticasone Propion-salmeterol Low Swelling     Influenza Virus Vaccines Low Other weakness    Insulin Glargine Low Swelling     Tetanus Immune Globulin Low Swelling     Tiotropium Low Swelling             Below are additional concerns with the patient's PTA list.    See PTA med list    Kesha Ya CPhT

## 2023-12-11 NOTE — NURSING NOTE
"Admission complete.  Patient states he uses [facility] pharmacy, \"but I haven't used them in a long time.\"  Patient is unsure what medications he takes on a daily basis  "

## 2023-12-11 NOTE — PROGRESS NOTES
"Trevor Torres is a 83 y.o. male on day 0 of admission presenting with Cellulitis, unspecified cellulitis site.        Subjective   Seen this morning.  No changes.  LOW AMPAC.   Cont. Abx.  Hemodynamically stable at this time.         Objective     Last Recorded Vitals  /60   Pulse 76   Temp 36.2 °C (97.2 °F) (Temporal)   Resp (!) 27   Ht 1.905 m (6' 3\")   Wt 104 kg (230 lb)   SpO2 94%   BMI 28.75 kg/m²     Intake/Output last 3 Shifts:  No intake/output data recorded.      =========RELEVANT RESULTS ==========  Labs  Lab Results   Component Value Date    WBC 5.9 12/10/2023    HGB 15.6 12/10/2023    HCT 45.4 12/10/2023    MCV 91 12/10/2023     12/10/2023     Lab Results   Component Value Date    GLUCOSE 299 (H) 12/10/2023    CALCIUM 8.3 (L) 12/10/2023     (L) 12/10/2023    K 4.3 12/10/2023    CO2 26 12/10/2023    CL 99 12/10/2023    BUN 12 12/10/2023    CREATININE 0.95 12/10/2023      Lab Results   Component Value Date    ALT 12 10/09/2022    AST 10 10/09/2022    ALKPHOS 82 10/09/2022    BILITOT 0.7 10/09/2022        Recent Echocardiogram (14D):   No echocardiogram results found for the past 14 days    TTE 12 month if available:  No echocardiogram results found for the past 12 months    Recent Imaging Results:  Colonoscopy  Patient Name: Trevor Torres  Procedure Date: 10/12/2022 12:42 PM  MRN: [id number]  Account Number: [id number]  YOB: 1940  Admit Type: Inpatient  Site: [facility] Endoscopy Room 1  Ethnicity: Not  or   Race: White  Attending MD: Efren Martínez MD, [id number]  Procedure:             Colonoscopy  Indications:           Rectal bleeding  Patient Profile:       Refer to note in patient chart for documentation of                          history and physical.  Providers:             Efren Marítnez MD (Doctor), Monserrat Bañuelos RN (Nurse)                          , Yu Nemecek, RN (Nurse)  Referring:             Mason Joiner  Medicines:             Monitored " Anesthesia Care  Procedure:             Pre-Anesthesia Assessment:                         - Prior to the procedure, a History and Physical was                          performed, and patient medications and allergies were                          reviewed. The patient is competent. The risks and                          benefits of the procedure and the sedation options and                          risks were discussed with the patient. All questions                          were answered and informed consent was obtained.                          Patient identification and proposed procedure were                          verified by the physician, the nurse and the                          anesthetist in the pre-procedure area. Mental Status                          Examination: alert and oriented. Airway Examination:                          Mallampati Class III (part of the uvula and soft                          palate visualized). Respiratory Examination: clear to                          auscultation. CV Examination: normal. Prophylactic                          Antibiotics: The patient does not require prophylactic                          antibiotics. Prior Anticoagulants: The patient has                          taken no anticoagulant or antiplatelet agents. ASA                          Grade Assessment: III - A patient with severe systemic                          disease. After reviewing the risks and benefits, the                          patient was deemed in satisfactory condition to                          undergo the procedure. The anesthesia plan was to use                          monitored anesthesia care (MAC). Immediately prior to                          administration of medications, the patient was                          re-assessed for adequacy to receive sedatives. The                          heart rate, respiratory rate, oxygen saturations,                          blood pressure,  adequacy of pulmonary ventilation, and                          response to care were monitored throughout the                          procedure. The physical status of the patient was                          re-assessed after the procedure.                         After I obtained informed consent, the scope was                          passed under direct vision. Throughout the procedure,                          the patient's blood pressure, pulse, and oxygen                          saturations were monitored continuously. The adult                          colonoscope was introduced through the anus and                          advanced to the cecum, identified by appendiceal                          orifice and ileocecal valve. The colonoscopy was                          performed without difficulty. The patient tolerated                          the procedure well. The quality of the bowel                          preparation was fair. The ileocecal valve, appendiceal                          orifice, and rectum were photographed.  Findings:       The perianal and digital rectal examinations were normal.       Red blood was found in the entire colon with clots as well as dark        blood. This was more prominent in the left colon as opposed to the right        colon       Multiple small and large-mouthed diverticula were found in the sigmoid        colon and descending colon. Despite careful washing and examination of        the diverticula a single source of bleeding was not appropriately        identified       A 10 mm polyp was found in the sigmoid colon. The polyp was        pedunculated. Polypectomy was not attempted.  Estimated Blood Loss:       Estimated blood loss was minimal.  Impression:            - Blood in the entire colon with diffuse                          diverticulosis so this is likely a left sided                          diverticular bleed                         - Single polyp in  the colon not removed  Recommendation:        - [facility] patient to [facility] for [facility] care.                         - Resume previous diet.                         - Continue present medications.                         - If there is evidence of recurrent bleeding then                          would recommend CT angiography for further                          localization of the bleeding and subsequent IR guided                          treatment                         - Repeat colonoscopy at the next available appointment                          for retreatment and polypectomy .  Procedure Code(s):     --- Professional ---                         97772, Colonoscopy, flexible; diagnostic, including                          collection of specimen(s) by brushing or washing, when                          performed (separate procedure)                         --- Technical ---                         93609, Colonoscopy, flexible; diagnostic, including                          collection of specimen(s) by brushing or washing, when                          performed (separate procedure)  Diagnosis Code(s):     --- Professional ---                         K92.2, Gastrointestinal hemorrhage, unspecified                         K62.5, Hemorrhage of anus and rectum                         K57.30, Diverticulosis of large intestine without                          perforation or abscess without bleeding                         --- Technical ---                         K92.2, Gastrointestinal hemorrhage, unspecified                         K62.5, Hemorrhage of anus and rectum                         K57.30, Diverticulosis of large intestine without                          perforation or abscess without bleeding  CPT copyright 2022 [employer]. All rights reserved.  The codes documented in this report are preliminary and upon  review may   be revised to meet current compliance  requirements.  Attending Participation:       I personally performed the entire procedure.  Efren Martínez MD  10/12/2022 2:30:06 PM  This report has been signed electronically.  Number of Addenda: 0  Note Initiated On: 10/12/2022 12:42 PM  Total Procedure Duration Time 0 hours 34 minutes 13 seconds       Exam     GENERAL:   no distress, alert and cooperative  HEENT: Normal Inspection, Mucous membranes moist, No JVD, No Lymphadenopathy  CARDIOVASCULAR: RRR, no murmurs, 2+ equal pulses of the extremities, normal S1 and S 2  RESPIRATORY: Patent airways, CTAB, thorax symmetric, No significant wheezing, Rales or Rhonchi  ABDOMEN: Soft, Non-Tender, Normal Bowel Sounds, No Distention  SKIN: Warm and dry, no lesions, no rashes  EXTREMITIES: b/l shin areas of erythema.  No abscess/rashes.  Bandages in place.   NEURO: A&O x 3, CN II-XII grossly intact  PSYCH: Appropriate mood and behavior    Additional Physical Exam Notes/Findings  .      ======= SCHEDULED MEDICATIONS =======  Scheduled medications   Medication Dose Route Frequency    atorvastatin  40 mg oral Nightly    carvedilol  3.125 mg oral BID    enalapril  10 mg oral Daily    enoxaparin  40 mg subcutaneous q24h    insulin glargine  12 Units subcutaneous q24h    insulin lispro  0-5 Units subcutaneous TID with meals    insulin lispro  7 Units subcutaneous TID with meals    melatonin  3 mg oral Daily    polyethylene glycol  17 g oral Daily    sulfamethoxazole-trimethoprim  160 mg oral q12h MANA       ========== PRN MEDICATIONS =========  acetaminophen, 650 mg, q4h PRN  acetaminophen, 650 mg, q4h PRN  dextrose 10 % in water (D10W), 0.3 g/kg/hr, Once PRN  dextrose, 25 g, q15 min PRN  docusate sodium, 100 mg, BID PRN  glucagon, 1 mg, q15 min PRN        ==============  DIET  ==============  Dietary Orders (From admission, onward)       Start     Ordered    12/10/23 2150  Adult diet Carb Controlled; 60 gram carb/meal, 30 gram Carb evening snack  Diet effective now        Question  Answer Comment   Diet type Carb Controlled    Carb diet selection: 60 gram carb/meal, 30 gram Carb evening snack        12/10/23 2149                    ====== Assessment/Plan   =======    ASSESSMENT:  Principal Problem:    Cellulitis, unspecified cellulitis site    ___________________________________________________    # RLE wound with superimposed cellulitis  # Inability to ambulate  # HTN  # HLD  # HFprEF  # COPD  # JAMSHID  # DM2  # Obesity    Edited by: Eladio Palumbo DO at 12/11/2023 0904    PLAN:  .  - Med rec needs to be confirmed/completed  - Restarted home antihypertensives/DLP/DM meds based on last hospital stay and endocrine recs.   - Cont wound care.  PT/OT  - Check hgA1c.   Edited by: Eladio Palumbo DO at 12/11/2023 0913    DVT Prophylaxis  SubQ lovenox    SUMMARY/DISPOSITION  Stable overall.  DC Planning.  Cont. PO Abx.                 Eladio Palumbo DO

## 2023-12-11 NOTE — PROGRESS NOTES
Patient was evaluated by nurse practitioner previously and disposition to home however when family arrives they stated that patient's caretaker has been admitted to nursing facility and there is no one to care for him.  He is nonambulatory at baseline.  Upon my exam he is awake responsive in no acute distress he has a area on the anterior shin which has exposed skin with surrounding cellulitic changes patient does not have any crepitus.  No concern for abscess or necrotizing fasciitis.  This is more consistent with open wound and cellulitis.  Oral Bactrim was given to patient.  I will discuss with admitting service and admit patient at this time.

## 2023-12-11 NOTE — PROGRESS NOTES
Physical Therapy    Physical Therapy Evaluation    Patient Name: Trevor Torres  MRN: [id number]  Today's Date: 12/11/2023   Time Calculation  Start Time: 0827  Stop Time: 0844  Time Calculation (min): 17 min    Assessment/Plan   PT Assessment  PT Assessment Results: Decreased strength, Decreased range of motion, Decreased endurance, Impaired balance, Decreased mobility, Decreased safety awareness  Rehab Prognosis: Good  Evaluation/Treatment Tolerance: Patient limited by fatigue  End of Session Communication: Bedside nurse  Assessment Comment: Continued skilled PT intervention indicated to facilitate increased strength, balance & functional mobility  End of Session Patient Position:  (on ED cart w/ bilat rails up, call light in reach, hob elevated to comfort)  IP OR SWING BED PT PLAN  Inpatient or Swing Bed: Inpatient  PT Plan  Treatment/Interventions: Bed mobility, Transfer training, Balance training, Range of motion, Therapeutic exercise, Therapeutic activity, Wheelchair management  PT Plan: Skilled PT  PT Frequency: 3 times per week  PT Discharge Recommendations: Moderate intensity level of continued care  PT - OK to Discharge: Yes (to next level of care when cleared by medical team)    Subjective     Current Problem:  Patient Active Problem List   Diagnosis    Adult-onset obesity    Benign essential hypertension    BPH (benign prostatic hyperplasia)    Cellulitis of left lower limb    Congestive heart failure (CMS/HCC)    COPD (chronic obstructive pulmonary disease) (CMS/HCC)    Diabetic neuropathy (CMS/HCC)    Difficulty in walking, not elsewhere classified    Diverticulosis of colon    DM2 (diabetes mellitus, type 2) (CMS/HCC)    Eczema    Esophageal reflux    Generalized osteoarthritis    Hyperlipidemia    Impacted cerumen    Leg weakness    OAB (overactive bladder)    Other specified disorders of veins    Peripheral edema    Peripheral vascular disease, unspecified (CMS/HCC)    Repeated falls    Obstructive  sleep apnea (adult) (pediatric)    Unspecified osteoarthritis, unspecified site    Venous stasis ulcers (CMS/HCC)    Cellulitis, unspecified cellulitis site       General Visit Information:  General  Reason for Referral: PT eval & treat/impaired mobility (12/10/23 c/o rt lower leg wound; dx: rle wound w/ superimposted cellulitis)  Caregiver Feedback: Per conference w/ RN patient stable to participate in therapy  General Comment: Pleasant & cooperative, confused, slow processing, impaired mobility verbalizes fear of falling; u/le abrasions, dressing rle wound; oriented to person &month    Home Living:  Home Living  Lives With:  (alone w/ unknown number of steps to enter [facility] setup including laundry, raised toilet w/ grab bar; standard bed; had lived w/ sister until she recently moved to [facility])    Prior Level of Function:  Prior Function Per Pt/Caregiver Report  Level of [place]:  (ind adl; neighbors doing driving/shopping otherwise ind iadl ; pt ind mobility out of bed pivot to/from WC nonambulatory xseveral years; denies h/o falls)    Precautions:  Precautions  Precautions Comment: fall  Pain:  Pain Assessment  Pain Assessment:  (le discomfort not rated , reports numbness/tingling rt 2hd digit)    Functional Assessments:     Bed Mobility  Bed Mobility:  (independent rolling rt/lt & bridging; mod assist x2 supine>sit; mod assist x1 sit>supine, mod assist seated scoot rt toward head of bed)  Transfers  Transfer:  (max assist x2 sit> partial stand w/ ww from ED cart; retropulsive & unable to fully extend trunk & le's)  Ambulation/Gait Training  Ambulation/Gait Training Performed:  (unable, could not attain functional standing)     Extremity/Trunk Assessments:     RLE   RLE :  (arom knee extension minus ~30deg, ankle df to ~neutral; strength grossly 3+/5)  LLE   LLE :  (arom knee extension minus ~30deg, ankle df minus ~15deg; strength grossly 3+/5)    Outcome Measures:  [facility] Basic Mobility  Turning from your back  to your side while in a flat bed without using bedrails: None  Moving from lying on your back to sitting on the side of a flat bed without using bedrails: A lot  Moving to and from bed to chair (including a wheelchair): A lot  Standing up from a chair using your arms (e.g. wheelchair or bedside chair): Total  To walk in hospital room: Total  Climbing 3-5 steps with railing: Total  Basic Mobility - Total Score: 11   Goals:  Encounter Problems       Encounter Problems (Active)       PT Problem       bed mobility  (Progressing)       Start:  12/11/23    Expected End:  12/25/23       Cga supine<>sit         transfers   (Progressing)       Start:  12/11/23    Expected End:  12/25/23       Cga pivot transfers bed<>WC          WC mobility  (Progressing)       Start:  12/11/23    Expected End:  12/25/23       Cga propelling WC >=50ft on level unobstructed surface         balance   (Progressing)       Start:  12/11/23    Expected End:  12/25/23       Sba maintaining sitting supported balance while performing >=10reps x2 sets ble therex to facilitate inc fxl mobility              Education Documentation  Mobility Training, taught by Bindu Johnson, PT at 12/11/2023 10:59 AM.  Learner: Patient  Readiness: Acceptance  Method: Explanation  Response: Verbalizes Understanding, Needs Reinforcement  Comment: safety, activity progression

## 2023-12-11 NOTE — HOSPITAL COURSE
Trevor Torres is a 83 y.o. male PMHx HTN, HLD, COPD, JAMSHID, DM2, HFprEF, obesity, nonambulatory presenting with right lower leg wound.  He states he has been in his usual state of health, he picked at a scab on his right lower leg and it started to bleed.  His daughter Bessy did not like the way it looked so wanted patient to be evaluated in the emergency department.  Patient admits to scratching at his lower legs.  Denies N/V/F/C, headache, confusion, chest pain, shortness of breath, cough, abdominal pain, diarrhea, urinary problems.     Vital signs stable in the ED.  Plan was to be discharged home, but apparently patient lives with his sister at her house and sister helps to care for him.  Sister is now in assisted living and patient not able to care for self very well.  He is mostly  bedbound, can pull himself up to stand and into a wheelchair.  Neighbors help out with food and he is not able to get groceries for himself.     Restarted on home meds including antihypertensives, statin, Insulin.   PO Abx.  Wound care.  PT/OT.   DC to SNF.

## 2023-12-11 NOTE — PROGRESS NOTES
Occupational Therapy    Evaluation    Patient Name: Trevor Torres  MRN: [id number]  Today's Date: 12/11/2023  Time Calculation  Start Time: 0826  Stop Time: 0844  Time Calculation (min): 18 min        Assessment:  End of Session Communication: Bedside nurse  End of Session Patient Position:  (on ED cart w/ bilat rails up, call light in reach, hob elevated to comfort)     Plan:  Treatment Interventions: ADL retraining, Functional transfer training, UE strengthening/ROM, Endurance training, Fine motor coordination activities, Compensatory technique education  OT Frequency: 3 times per week  OT Discharge Recommendations: Moderate intensity level of continued care  OT - OK to Discharge: Yes (once medically appropriate to next level of care)  Treatment Interventions: ADL retraining, Functional transfer training, UE strengthening/ROM, Endurance training, Fine motor coordination activities, Compensatory technique education    Subjective   Current Problem:  1. Cellulitis, unspecified cellulitis site          General:  General  Reason for Referral: OT eval and tx; ADLs. dx; RLE wound with superimposed cellulitis  Referred By: [place]  Past Medical History Relevant to Rehab: 83 y.o. male PMHx HTN, HLD, COPD, JAMSHID, DM2, HFprEF, obesity, nonambulatory presenting with right lower leg wound.  He states he has been in his usual state of health, he picked at a scab on his right lower leg and it started to bleed.  His daughter Bessy did not like the way it looked so wanted patient to be evaluated in the emergency department.  Patient admits to scratching at his lower legs.  Denies N/V/F/C, headache, confusion, chest pain, shortness of breath, cough, abdominal pain, diarrhea, urinary problems.  Co-Treatment: PT  Co-Treatment Reason: for safety and to maximizwe therapeutic performance  Prior to Session Communication: Bedside nurse  Patient Position Received:  (patient lying in ED cart at start and end of eval, 2 rails up. call light in  "reach, all needs met. alarm not present upon arrival)  General Comment: patient presenting as a poor historian  Precautions:  Medical Precautions:  (activity with exceptions, fall precautions)       Pain: patient denies pain       Objective   Cognition:  Overall Cognitive Status: Impaired (disoriented to place and year)           Home Living:  Type of Home:  (patient initially reports lives with sister, then reports she lives in an shelter and he lives alone. house; unable to report steps to enter. ranch style home with laundry on main level.)  Bathroom Shower/Tub:  (reports sponge bathes)  Bathroom Toilet:  (raised toilet with grab bar)  Prior Function:  Level of [place]:  (reports independent in ADLs/IADLs. w/c user; hasn't walked in \"years\". completes SPT to wheelchair. denies falls. sleeps in standard bed)       ADL:  LE Dressing Assistance:  (MAX A to carlota non skid socks at bed level)  Toileting Deficit:  (DEP for brief change and hygeine at bed level via bridging hips; episode of urinary incontinence; declining to attempt to complete hygeine)    Bed Mobility/Transfers: Bed Mobility  Bed Mobility:  (patient rolling left/right in ED cart and brdging hips at MOD I; completing supine to sit with MOD A x 2 and sit to supine with MOD A x 1)    Transfers  Transfer:  (attempted STS. achieving partial stand with MAX A x 2 and support OF WW)     Sensation:  Sensation Comment: reports numbness in fingers on right hand  Strength:  Strength Comments: BUE ROM/MMT WFL; although presenting with contractures in right hand MPs/PIPs      Outcome Measures:[facility] Daily Activity  Putting on and taking off regular lower body clothing: Total  Bathing (including washing, rinsing, drying): Total  Putting on and taking off regular upper body clothing: A lot  Toileting, which includes using toilet, bedpan or urinal: Total  Taking care of personal grooming such as brushing teeth: A little  Eating Meals: None  Daily Activity - Total Score: " 12        Education Documentation  Body Mechanics, taught by Bri Reyes OT at 12/11/2023 11:01 AM.  Learner: Patient  Readiness: Acceptance  Method: Explanation  Response: Verbalizes Understanding, Needs Reinforcement    ADL Training, taught by Bri Reyes OT at 12/11/2023 11:01 AM.  Learner: Patient  Readiness: Acceptance  Method: Explanation  Response: Verbalizes Understanding, Needs Reinforcement    Education Comments  No comments found.        OP EDUCATION:       Goals:  Encounter Problems       Encounter Problems (Active)       OT Goals       STG- patient will complete LB dressing with CGA with use of ae/ad/dme prn (Progressing)       Start:  12/11/23    Expected End:  12/25/23            STG- patient will complete toileting with CGA with use of ae/ad/dme prn (Progressing)       Start:  12/11/23    Expected End:  12/25/23            STG- patient will complete transfers to/from bed, chair, commode to CGA with use of ae/ad/dme prn (Progressing)       Start:  12/11/23    Expected End:  12/25/23            STG- patient will tolerate 15 mins of UB therapeutic exercise to increase UB strength for ADLs and functional transfers/mobility (Progressing)       Start:  12/11/23    Expected End:  12/25/23

## 2023-12-12 NOTE — DISCHARGE SUMMARY
Hospital Course  Trevor Torres is an 83 y.o. male who is nonambulatory at baseline who presented with right lower leg wound.  He has chronic stasis cellulitis for 5-6 years. He said it flared up recently; he picked at a scab and it started to bleed.  His daughter Bessy did not like the way it looked and wanted him to be evaluated in the emergency department. Plan was to be discharged home but he lives with his sister who helps to care for him.  She is now in assisted living and he is not able to care for himself, is mostly bedbound. Neighbors help out with food and he is not able to get groceries for himself. He was treated with Bactrim. He was not taking any of his medications and they were restarted based on last hospital stay and endocrine recommendations. He is medically stable for discharge to SNF facility.    Subjective  Despite asked multiple times, pt is unsure whether or not his right leg looks better today than on admission. He says he's had redness for 5-6 years that got better but worsened recently (he won't say when). He denies any pain. He's not sure if he filled rx's recently at his pharmacy (chart says it's been over a year).      Objective    Vitals:    12/12/23 1140   BP: 107/63   Pulse: 73   Resp:    Temp: 37.1 °C (98.8 °F)   SpO2: 95%       Vitals:    12/10/23 1852   Weight: 104 kg (230 lb)       Results for orders placed or performed during the hospital encounter of 12/10/23 (from the past 24 hour(s))   POCT GLUCOSE   Result Value Ref Range    POCT Glucose 265 (H) 74 - 99 mg/dL   POCT GLUCOSE   Result Value Ref Range    POCT Glucose 126 (H) 74 - 99 mg/dL   POCT GLUCOSE   Result Value Ref Range    POCT Glucose 180 (H) 74 - 99 mg/dL   Basic Metabolic Panel   Result Value Ref Range    Glucose 180 (H) 74 - 99 mg/dL    Sodium 134 (L) 136 - 145 mmol/L    Potassium 4.6 3.5 - 5.3 mmol/L    Chloride 102 98 - 107 mmol/L    Bicarbonate 25 21 - 32 mmol/L    Anion Gap 12 10 - 20 mmol/L    Urea Nitrogen 14 6  - 23 mg/dL    Creatinine 1.10 0.50 - 1.30 mg/dL    eGFR 67 >60 mL/min/1.73m*2    Calcium 8.5 (L) 8.6 - 10.3 mg/dL   CBC   Result Value Ref Range    WBC 5.8 4.4 - 11.3 x10*3/uL    nRBC 0.0 0.0 - 0.0 /100 WBCs    RBC 4.62 4.50 - 5.90 x10*6/uL    Hemoglobin 14.3 13.5 - 17.5 g/dL    Hematocrit 42.6 41.0 - 52.0 %    MCV 92 80 - 100 fL    MCH 31.0 26.0 - 34.0 pg    MCHC 33.6 32.0 - 36.0 g/dL    RDW 12.5 11.5 - 14.5 %    Platelets 231 150 - 450 x10*3/uL       Constitutional: Well developed, awake, alert, oriented x3, no acute distress, cooperative   Eyes: EOMI, clear sclera   ENMT: mucous membranes moist, no lesions seen   Head/Neck: Neck supple, no apparent injury, head atraumatic   Respiratory/Thorax: CTAB, good chest expansion, respirations even and unlabored   Cardiovascular: Regular rate and rhythm, no murmurs/rubs/gallops, normal S1 and S 2   Gastrointestinal: Abdomen nondistended, soft, nontender, +BS, no bruits   Musculoskeletal: ROM intact, no joint swelling, normal  strength   Extremities: no cyanosis, edema, contusions or clubbing   Neurological: no focal deficit, pt alert and oriented x3   Psychological: Appropriate affect and behavior   Skin: Bilateral pretibial focal erythema with scabbing noted R>L, no discharge        Past Medical History:   Diagnosis Date    COPD (chronic obstructive pulmonary disease) (CMS/[facility])     Diabetes (CMS/[facility])     HTN (hypertension)     Hyperlipidemia, unspecified     Obstructive sleep apnea (adult) (pediatric)     JAMSHID treated with BiPAP    PVD (peripheral vascular disease) (CMS/[facility])            Your medication list      as of December 10, 2023  7:00 PM     You have not been prescribed any medications.         No future appointments.      Grecia Hebert, CNP  Hospital Medicine

## 2023-12-12 NOTE — CONSULTS
“Wound” Ostomy Consultation        1. Chief Complaint: wound check and evaluation   2. History of Present Illness:   BLE dry red scabs with erythema surrounding the scabs,   3. Past Medical History: Reviewed   4. Past Surgical History: Reviewed  5. Allergies:     Reviewed  6. Social/Family History: Reviewed  7. Medications:  Reviewed  8. Labs/Data/Test Results: Reviewed   9. Progress Notes:  Reviewed     10. System Review: system review was performed with these findings:   Integumentary: Will be described below    11.  Physical Examination:   Constitutional: About stated age and well nourished; No weight loss/fevers/chills.    Psych: Alert, oriented to person/place/time, intact memory; normal limit affect/judgment/insight   Respiratory: Symmetrical expansion/effort; Clear; No cough/shortness of breath   Cardiovascular: No chest pain; trace edema surrounding the dry scabs due to cellulitis   Neuro: Normal limit sensation  Musculoskeletal: Normal limit symmetry/range of motion; Normal limit muscle strength/tone of all extremities   Integumentary: Abnormal skin color, texture, and turgor, BLE dry skin; No ecchymotic areas; No friable skin; No rash/lesions/excoriation/burns; No diaphoresis; No jaundice, leslee, pallor skin;   BLE have healthy red scabs, no odor or drainage and are intact. Photos in EMR   Eyes:  Pupils are equal, round and reactive to light  Neck: Normal limit appearance/movements; Trachea midline;     12. Nutritional Status:   Appetite: good     13. Wound Pain/Discomfort: No     Assessment/Plan/Treatment Recommendations:     BLE brodie feet: wash with soap and water, apply Aquaphor daily  BLE: scabs: apply Betadine, allow to dry, may use a tubifast to secure dry gauze over scabs.     Education provided; Treatment demonstrated; Questions answered  D/W RN / MD  Orders received     I have spent 25 minutes with the patient for physical and wound assessment, wound cleaning, dressing demonstration and answering  questions. More than 50% of the time spent with the patient included education/counselling/coordination of care.     Leisa Morales RN WOCN

## 2023-12-12 NOTE — PROGRESS NOTES
This SW attempted to reach pt's dtr Bessy, SW left voicemail requesting return call. SW will continue to follow.  ANTONI TINOCO, YOLANDA

## 2023-12-13 NOTE — PROGRESS NOTES
Subjective  Pt resting quietly. He denies pain. He doesn't remember scratching himself or itching on his arms (has bloody scratch marks).    Objective    Vitals:    12/13/23 0759   BP: 114/57   Pulse: 73   Resp: 16   Temp: 37.1 °C (98.8 °F)   SpO2: 96%       Vitals:    12/10/23 1852   Weight: 104 kg (230 lb)       Scheduled medications  atorvastatin, 40 mg, oral, Nightly  carvedilol, 3.125 mg, oral, BID  enalapril, 10 mg, oral, Daily  enoxaparin, 40 mg, subcutaneous, q24h  insulin glargine, 15 Units, subcutaneous, q24h  insulin lispro, 0-5 Units, subcutaneous, TID with meals  insulin lispro, 9 Units, subcutaneous, TID with meals  melatonin, 3 mg, oral, Daily  polyethylene glycol, 17 g, oral, Daily  sulfamethoxazole-trimethoprim, 160 mg, oral, q12h MANA  white petrolatum, , Topical, BID      Continuous medications     PRN medications  PRN medications: acetaminophen, acetaminophen, dextrose 10 % in water (D10W), dextrose, docusate sodium, glucagon, white petrolatum    Results for orders placed or performed during the hospital encounter of 12/10/23 (from the past 24 hour(s))   POCT GLUCOSE   Result Value Ref Range    POCT Glucose 293 (H) 74 - 99 mg/dL   POCT GLUCOSE   Result Value Ref Range    POCT Glucose 254 (H) 74 - 99 mg/dL   POCT GLUCOSE   Result Value Ref Range    POCT Glucose 249 (H) 74 - 99 mg/dL   POCT GLUCOSE   Result Value Ref Range    POCT Glucose 151 (H) 74 - 99 mg/dL   POCT GLUCOSE   Result Value Ref Range    POCT Glucose 186 (H) 74 - 99 mg/dL       Constitutional: Well developed, awake, alert, oriented x3, no acute distress, cooperative   Eyes: EOMI, clear sclera   ENMT: mucous membranes moist, no lesions seen   Head/Neck: Neck supple, no apparent injury, head atraumatic   Respiratory/Thorax: CTAB, good chest expansion, respirations even and unlabored   Cardiovascular: Regular rate and rhythm, no murmurs/rubs/gallops, normal S1 and S 2   Gastrointestinal: Abdomen nondistended, soft, nontender, +BS, no  bruits   Musculoskeletal: ROM intact, no joint swelling, normal  strength   Extremities: no cyanosis, edema, contusions or clubbing   Neurological: no focal deficit, pt alert and oriented x3   Psychological: Appropriate affect and behavior   Skin: Bilateral pretibial focal erythema with scabbing noted R>L, no discharge; dried bloody scratch marks on left forearm and right shoulder        Past Medical History:   Diagnosis Date    COPD (chronic obstructive pulmonary disease) (CMS/[facility])     Diabetes (CMS/[facility])     HTN (hypertension)     Hyperlipidemia, unspecified     Obstructive sleep apnea (adult) (pediatric)     JAMSHID treated with BiPAP    PVD (peripheral vascular disease) (CMS/[facility])         Assessment/Plan  Inability to ambulate     - pt lives with his sister/caretaker, who is now in assisted living      - he is not able to care for himself, is mostly bedbound   Chronic RLE wound with superimposed cellulitis     - pt being treated with 5-day course of Bactrim  HTN  HLD  DM2  Noncompliance     - pt restarted on home antihypertensives/DLP/DM meds based on last hospital stay and endocrine recs, will increase lispro   DVT prophylaxis: Lovenox  Discharge disposition     - pt discharged and awaiting placement        Grecia Hebert, CNP  Hospital Medicine

## 2023-12-13 NOTE — PROGRESS NOTES
Call placed to [facility] daughter Bessy- listed in contact. We have been unable to make contact with her. Voicemail left again requesting immediate call back to this TCC. Call also placed to nurses station requesting if Bessy comes to bedside she calls me regarding SNF choice. Care Transitions will continue to follow during course of hospital stay for any changes to discharge needs.  Nely Arzate RN     10:04am received a call back from daughter Bessy who is in [place] at this time. She reports she will be back from her vacation Friday. We discussed SNF recommendation and possibility of LTC if patient can not live home alone safely. Bessy agrees and notes his sister lives at [facility] and that is where she would like patient to go. Referral sent to [facility] SNF, daughter also chose [facility] as secondary SNF. Awaiting acceptance. Patient ready for discharge today.     [facility] does not have beds available. [facility] can accept. Call made to Bessy who reports she is agreeable to discharge to [facility] as FOC. Nely Arzate RN     Accepted to [facility] and willing to accept today. Transportation and [id number] to be set up.